# Patient Record
Sex: MALE | Race: WHITE | Employment: FULL TIME
[De-identification: names, ages, dates, MRNs, and addresses within clinical notes are randomized per-mention and may not be internally consistent; named-entity substitution may affect disease eponyms.]

---

## 2023-08-06 ENCOUNTER — HOSPITAL ENCOUNTER (EMERGENCY)
Facility: HOSPITAL | Age: 33
Discharge: HOME | End: 2023-08-06
Attending: EMERGENCY MEDICINE
Payer: COMMERCIAL

## 2023-08-06 VITALS
HEART RATE: 80 BPM | TEMPERATURE: 97.8 F | RESPIRATION RATE: 24 BRPM | DIASTOLIC BLOOD PRESSURE: 87 MMHG | OXYGEN SATURATION: 96 % | SYSTOLIC BLOOD PRESSURE: 126 MMHG

## 2023-08-06 DIAGNOSIS — T78.40XA ALLERGIC REACTION, INITIAL ENCOUNTER: Primary | ICD-10-CM

## 2023-08-06 PROCEDURE — 96374 THER/PROPH/DIAG INJ IV PUSH: CPT

## 2023-08-06 PROCEDURE — 96361 HYDRATE IV INFUSION ADD-ON: CPT

## 2023-08-06 PROCEDURE — 99284 EMERGENCY DEPT VISIT MOD MDM: CPT

## 2023-08-06 PROCEDURE — 6360000002 HC RX W HCPCS

## 2023-08-06 PROCEDURE — 2580000003 HC RX 258

## 2023-08-06 PROCEDURE — 2500000003 HC RX 250 WO HCPCS

## 2023-08-06 PROCEDURE — 96375 TX/PRO/DX INJ NEW DRUG ADDON: CPT

## 2023-08-06 PROCEDURE — A4216 STERILE WATER/SALINE, 10 ML: HCPCS

## 2023-08-06 RX ORDER — EPINEPHRINE 0.3 MG/.3ML
0.3 INJECTION SUBCUTANEOUS ONCE
Qty: 0.3 ML | Refills: 0 | Status: SHIPPED | OUTPATIENT
Start: 2023-08-06 | End: 2023-08-06

## 2023-08-06 RX ORDER — 0.9 % SODIUM CHLORIDE 0.9 %
1000 INTRAVENOUS SOLUTION INTRAVENOUS ONCE
Status: COMPLETED | OUTPATIENT
Start: 2023-08-06 | End: 2023-08-06

## 2023-08-06 RX ADMIN — SODIUM CHLORIDE 1000 ML: 9 INJECTION, SOLUTION INTRAVENOUS at 17:52

## 2023-08-06 RX ADMIN — FAMOTIDINE 20 MG: 10 INJECTION INTRAVENOUS at 17:52

## 2023-08-06 RX ADMIN — METHYLPREDNISOLONE SODIUM SUCCINATE 125 MG: 125 INJECTION, POWDER, FOR SOLUTION INTRAMUSCULAR; INTRAVENOUS at 17:52

## 2023-08-06 ASSESSMENT — ENCOUNTER SYMPTOMS
SHORTNESS OF BREATH: 0
TROUBLE SWALLOWING: 0
NAUSEA: 0
VOICE CHANGE: 1
ROS SKIN COMMENTS: GENERALIZED HIVES
ABDOMINAL PAIN: 0
FACIAL SWELLING: 0
VOMITING: 0

## 2023-08-06 NOTE — ED NOTES
Bedside and Verbal shift change report given to 2250 Amaya Jim (oncoming nurse) by Sussy Kennedy (offgoing nurse). Report included the following information SBAR, ED Summary, Intake/Output, MAR and Recent Results.        Sabra Trevino RN  08/06/23 0259

## 2023-08-06 NOTE — ED PROVIDER NOTES
read by the radiologist. Plain radiographic images are visualized and preliminarily interpreted by the emergency physician with the below findings:    Interpretation per the Radiologist below, if available at the time of this note:    No orders to display        LABS:  Labs Reviewed - No data to display    All other labs were within normal range or not returned as of this dictation. EMERGENCY DEPARTMENT COURSE and DIFFERENTIAL DIAGNOSIS/MDM:   Vitals:    Vitals:    08/06/23 1844 08/06/23 1859 08/06/23 1914 08/06/23 1930   BP: 124/76 118/73  111/71   Pulse: 95 91 82 76   Resp: 20 18 15 20   Temp:       SpO2: 94% 96% 96% 91%     05:42 PM  Presentation, management, and disposition were discussed with the attending physician, Dr. Maggi Paiz who is in agreement with plan of care. 06:19 PM  Pt reassessed. Lungs still clear and pt appears to be comfortable without the use of accessory muscles. Hives improved after medication. Pt denies having any other needs at this time. 07:58 PM  Pt reassessed. Hives resolved. Lungs still clear with ausculation. Pt denies having any other needs at this time. Pt educated about     Medical Decision Making  Risk  Prescription drug management. Pt is a 27 y/o male presenting to the ED c/o voice changes, throat swelling, and hives after being stung by a bee. Pt was given epi and benadryl while en route to ED by EMS. Doubt acute oral obstruction given pt is able to manage secretions w/o difficulty and no obvious oral swelling was noted w/ assessment. Lungs were clear with ausculation and pt is not using any accessory muscles or showing signs of acute distress. Pt was given IV fluids, pepcid, and solumedrol while in the ED. Pt to be monitored for at least 4 hours after Epi. Pt educated about being monitored for at least 4 hours and the need to carry Epi-pen in the future. A prescription for Epi-pen was sent to the requested pharmacy.  Hand-off given to Dr. Radha Flor for continued

## 2023-08-06 NOTE — ED TRIAGE NOTES
Arrives EMS c/o allergic rxn to bee stings, generalized upper body hives, voice change and throat swelling on EMS arrival. Pt given . 3IM epi, 50mg Benadryl. Pt reports throat swelling and voice improved on arrival. Hives are still present.

## 2023-08-07 NOTE — ED NOTES
Patient given discharge papers and instructions by this RN. Patient verbalized understanding and stated not having questions or concerns regarding his care. Patient ambulatory out of ED with wife in no new acute distress.       Kacy Joseph RN  08/06/23 6800

## 2023-08-07 NOTE — ED NOTES
9:18 PM  Received patient in signout. EpiPen prescription sent. He is asymptomatic currently and has been 4 hours since EpiPen administered. Will discharge with strict return precautions and primary care follow-up.     MD Adina Fuentes MD  08/06/23 3657

## 2024-12-05 ENCOUNTER — IMMUNIZATION (OUTPATIENT)
Age: 34
End: 2024-12-05